# Patient Record
Sex: MALE | Race: WHITE | NOT HISPANIC OR LATINO | Employment: FULL TIME | URBAN - NONMETROPOLITAN AREA
[De-identification: names, ages, dates, MRNs, and addresses within clinical notes are randomized per-mention and may not be internally consistent; named-entity substitution may affect disease eponyms.]

---

## 2021-03-16 ENCOUNTER — APPOINTMENT (OUTPATIENT)
Dept: GENERAL RADIOLOGY | Facility: HOSPITAL | Age: 31
End: 2021-03-16

## 2021-03-16 ENCOUNTER — APPOINTMENT (OUTPATIENT)
Dept: CT IMAGING | Facility: HOSPITAL | Age: 31
End: 2021-03-16

## 2021-03-16 ENCOUNTER — HOSPITAL ENCOUNTER (EMERGENCY)
Facility: HOSPITAL | Age: 31
Discharge: HOME | End: 2021-03-16
Attending: EMERGENCY MEDICINE | Admitting: EMERGENCY MEDICINE

## 2021-03-16 VITALS
BODY MASS INDEX: 41.91 KG/M2 | TEMPERATURE: 97.4 F | RESPIRATION RATE: 20 BRPM | HEIGHT: 68 IN | WEIGHT: 276.5 LBS | OXYGEN SATURATION: 98 % | HEART RATE: 81 BPM | SYSTOLIC BLOOD PRESSURE: 136 MMHG | DIASTOLIC BLOOD PRESSURE: 60 MMHG

## 2021-03-16 DIAGNOSIS — R00.2 PALPITATIONS: Primary | ICD-10-CM

## 2021-03-16 DIAGNOSIS — M89.9 LYTIC BONE LESIONS ON XRAY: ICD-10-CM

## 2021-03-16 DIAGNOSIS — D72.829 LEUKOCYTOSIS, UNSPECIFIED TYPE: ICD-10-CM

## 2021-03-16 LAB
ANION GAP SERPL CALCULATED.3IONS-SCNC: 11 MMOL/L (ref 5–15)
APTT PPP: 25.5 SECONDS (ref 20–40.3)
BASOPHILS # BLD AUTO: 0.02 10*3/MM3 (ref 0–0.2)
BASOPHILS NFR BLD AUTO: 0.1 % (ref 0–1.5)
BILIRUB UR QL STRIP: NEGATIVE
BUN SERPL-MCNC: 12 MG/DL (ref 6–20)
BUN/CREAT SERPL: 13.8 (ref 7–25)
CALCIUM SPEC-SCNC: 9.6 MG/DL (ref 8.6–10.5)
CHLORIDE SERPL-SCNC: 102 MMOL/L (ref 98–107)
CLARITY UR: CLEAR
CO2 SERPL-SCNC: 25 MMOL/L (ref 22–29)
COLOR UR: YELLOW
CREAT SERPL-MCNC: 0.87 MG/DL (ref 0.76–1.27)
D-DIMER, QUANTITATIVE (MAD,POW, STR): <270 NG/ML (FEU) (ref 0–470)
DEPRECATED RDW RBC AUTO: 42.1 FL (ref 37–54)
EOSINOPHIL # BLD AUTO: 0 10*3/MM3 (ref 0–0.4)
EOSINOPHIL NFR BLD AUTO: 0 % (ref 0.3–6.2)
ERYTHROCYTE [DISTWIDTH] IN BLOOD BY AUTOMATED COUNT: 13.5 % (ref 12.3–15.4)
GFR SERPL CREATININE-BSD FRML MDRD: 102 ML/MIN/1.73
GLUCOSE SERPL-MCNC: 116 MG/DL (ref 65–99)
GLUCOSE UR STRIP-MCNC: NEGATIVE MG/DL
HCT VFR BLD AUTO: 41.2 % (ref 37.5–51)
HGB BLD-MCNC: 14 G/DL (ref 13–17.7)
HGB UR QL STRIP.AUTO: NEGATIVE
HOLD SPECIMEN: NORMAL
IMM GRANULOCYTES # BLD AUTO: 0.06 10*3/MM3 (ref 0–0.05)
IMM GRANULOCYTES NFR BLD AUTO: 0.3 % (ref 0–0.5)
INR PPP: 1.03 (ref 0.8–1.2)
KETONES UR QL STRIP: NEGATIVE
LEUKOCYTE ESTERASE UR QL STRIP.AUTO: NEGATIVE
LYMPHOCYTES # BLD AUTO: 1.67 10*3/MM3 (ref 0.7–3.1)
LYMPHOCYTES NFR BLD AUTO: 9.5 % (ref 19.6–45.3)
MAGNESIUM SERPL-MCNC: 2 MG/DL (ref 1.6–2.6)
MCH RBC QN AUTO: 29.4 PG (ref 26.6–33)
MCHC RBC AUTO-ENTMCNC: 34 G/DL (ref 31.5–35.7)
MCV RBC AUTO: 86.6 FL (ref 79–97)
MONOCYTES # BLD AUTO: 0.68 10*3/MM3 (ref 0.1–0.9)
MONOCYTES NFR BLD AUTO: 3.9 % (ref 5–12)
NEUTROPHILS NFR BLD AUTO: 15.14 10*3/MM3 (ref 1.7–7)
NEUTROPHILS NFR BLD AUTO: 86.2 % (ref 42.7–76)
NITRITE UR QL STRIP: NEGATIVE
NRBC BLD AUTO-RTO: 0 /100 WBC (ref 0–0.2)
NT-PROBNP SERPL-MCNC: 43.8 PG/ML (ref 0–450)
PH UR STRIP.AUTO: 8 [PH] (ref 5–9)
PLATELET # BLD AUTO: 316 10*3/MM3 (ref 140–450)
PMV BLD AUTO: 10 FL (ref 6–12)
POTASSIUM SERPL-SCNC: 4.2 MMOL/L (ref 3.5–5.2)
PROT UR QL STRIP: NEGATIVE
PROTHROMBIN TIME: 13.9 SECONDS (ref 11.1–15.3)
RBC # BLD AUTO: 4.76 10*6/MM3 (ref 4.14–5.8)
SODIUM SERPL-SCNC: 138 MMOL/L (ref 136–145)
SP GR UR STRIP: 1.01 (ref 1–1.03)
T4 FREE SERPL-MCNC: 1.24 NG/DL (ref 0.93–1.7)
TROPONIN T SERPL-MCNC: <0.01 NG/ML (ref 0–0.03)
TROPONIN T SERPL-MCNC: <0.01 NG/ML (ref 0–0.03)
TSH SERPL DL<=0.05 MIU/L-ACNC: 2.13 UIU/ML (ref 0.27–4.2)
UROBILINOGEN UR QL STRIP: NORMAL
WBC # BLD AUTO: 17.57 10*3/MM3 (ref 3.4–10.8)

## 2021-03-16 PROCEDURE — 87040 BLOOD CULTURE FOR BACTERIA: CPT | Performed by: EMERGENCY MEDICINE

## 2021-03-16 PROCEDURE — 83735 ASSAY OF MAGNESIUM: CPT | Performed by: EMERGENCY MEDICINE

## 2021-03-16 PROCEDURE — 80048 BASIC METABOLIC PNL TOTAL CA: CPT | Performed by: EMERGENCY MEDICINE

## 2021-03-16 PROCEDURE — 99284 EMERGENCY DEPT VISIT MOD MDM: CPT

## 2021-03-16 PROCEDURE — 93005 ELECTROCARDIOGRAM TRACING: CPT

## 2021-03-16 PROCEDURE — 85730 THROMBOPLASTIN TIME PARTIAL: CPT | Performed by: EMERGENCY MEDICINE

## 2021-03-16 PROCEDURE — 85379 FIBRIN DEGRADATION QUANT: CPT | Performed by: EMERGENCY MEDICINE

## 2021-03-16 PROCEDURE — 85610 PROTHROMBIN TIME: CPT | Performed by: EMERGENCY MEDICINE

## 2021-03-16 PROCEDURE — 36415 COLL VENOUS BLD VENIPUNCTURE: CPT | Performed by: EMERGENCY MEDICINE

## 2021-03-16 PROCEDURE — 93005 ELECTROCARDIOGRAM TRACING: CPT | Performed by: EMERGENCY MEDICINE

## 2021-03-16 PROCEDURE — 84439 ASSAY OF FREE THYROXINE: CPT | Performed by: EMERGENCY MEDICINE

## 2021-03-16 PROCEDURE — 84484 ASSAY OF TROPONIN QUANT: CPT | Performed by: EMERGENCY MEDICINE

## 2021-03-16 PROCEDURE — 96360 HYDRATION IV INFUSION INIT: CPT

## 2021-03-16 PROCEDURE — 71045 X-RAY EXAM CHEST 1 VIEW: CPT

## 2021-03-16 PROCEDURE — 85025 COMPLETE CBC W/AUTO DIFF WBC: CPT | Performed by: EMERGENCY MEDICINE

## 2021-03-16 PROCEDURE — 93010 ELECTROCARDIOGRAM REPORT: CPT | Performed by: INTERNAL MEDICINE

## 2021-03-16 PROCEDURE — 81003 URINALYSIS AUTO W/O SCOPE: CPT | Performed by: EMERGENCY MEDICINE

## 2021-03-16 PROCEDURE — 25010000002 IOPAMIDOL 61 % SOLUTION: Performed by: EMERGENCY MEDICINE

## 2021-03-16 PROCEDURE — 83880 ASSAY OF NATRIURETIC PEPTIDE: CPT | Performed by: EMERGENCY MEDICINE

## 2021-03-16 PROCEDURE — 71260 CT THORAX DX C+: CPT

## 2021-03-16 PROCEDURE — 84443 ASSAY THYROID STIM HORMONE: CPT | Performed by: EMERGENCY MEDICINE

## 2021-03-16 RX ORDER — SODIUM CHLORIDE 9 MG/ML
125 INJECTION, SOLUTION INTRAVENOUS CONTINUOUS
Status: DISCONTINUED | OUTPATIENT
Start: 2021-03-16 | End: 2021-03-16 | Stop reason: HOSPADM

## 2021-03-16 RX ORDER — SODIUM CHLORIDE 0.9 % (FLUSH) 0.9 %
10 SYRINGE (ML) INJECTION AS NEEDED
Status: DISCONTINUED | OUTPATIENT
Start: 2021-03-16 | End: 2021-03-16 | Stop reason: HOSPADM

## 2021-03-16 RX ADMIN — SODIUM CHLORIDE 1000 ML: 9 INJECTION, SOLUTION INTRAVENOUS at 07:49

## 2021-03-16 RX ADMIN — IOPAMIDOL 90 ML: 612 INJECTION, SOLUTION INTRAVENOUS at 10:40

## 2021-03-16 NOTE — DISCHARGE INSTRUCTIONS
Return ED chest pain ,shortness of air, syncope, palpitations, worse condition, any other concerns  Followup orthopedic surgery as directed for further evaluation right clavicle bone lesion

## 2021-03-18 ENCOUNTER — OFFICE VISIT (OUTPATIENT)
Dept: FAMILY MEDICINE CLINIC | Facility: CLINIC | Age: 31
End: 2021-03-18

## 2021-03-18 VITALS
HEART RATE: 100 BPM | WEIGHT: 274.6 LBS | DIASTOLIC BLOOD PRESSURE: 72 MMHG | HEIGHT: 68 IN | SYSTOLIC BLOOD PRESSURE: 148 MMHG | TEMPERATURE: 97.5 F | OXYGEN SATURATION: 98 % | BODY MASS INDEX: 41.62 KG/M2

## 2021-03-18 DIAGNOSIS — Z09 FOLLOW UP: Primary | ICD-10-CM

## 2021-03-18 DIAGNOSIS — R00.2 HEART PALPITATIONS: ICD-10-CM

## 2021-03-18 DIAGNOSIS — K04.7 DENTAL INFECTION: ICD-10-CM

## 2021-03-18 DIAGNOSIS — J30.9 ALLERGIC RHINITIS, UNSPECIFIED SEASONALITY, UNSPECIFIED TRIGGER: ICD-10-CM

## 2021-03-18 PROCEDURE — 99203 OFFICE O/P NEW LOW 30 MIN: CPT | Performed by: NURSE PRACTITIONER

## 2021-03-18 RX ORDER — FLUTICASONE PROPIONATE 50 MCG
2 SPRAY, SUSPENSION (ML) NASAL DAILY
Qty: 16 G | Refills: 11 | Status: SHIPPED | OUTPATIENT
Start: 2021-03-18

## 2021-03-18 RX ORDER — LORATADINE 10 MG/1
10 TABLET ORAL DAILY
Qty: 30 TABLET | Refills: 11 | Status: SHIPPED | OUTPATIENT
Start: 2021-03-18

## 2021-03-18 RX ORDER — AMOXICILLIN 875 MG/1
875 TABLET, COATED ORAL 2 TIMES DAILY
Qty: 20 TABLET | Refills: 0 | Status: SHIPPED | OUTPATIENT
Start: 2021-03-18 | End: 2021-04-16

## 2021-03-18 NOTE — PROGRESS NOTES
Subjective   Thien Green is a 31 y.o. male. ER follow up.     History of Present Illness   Patient presents today for ER follow up. He went to the ER a few days ago for heart palpations. The ER discovered nodule on his right clavicle, he is seeing orthopedics next week. His blood work revealed high white blood cell count, all other labs unremarkable. EKG showed sinus tachycardia. Pt says he has stopped drinking energy drinks and cokes. He now drinks mostly water with an occasional green tea. Pt denies any chest pain, lower extremity edema, or fatigue. He is still having occasional heart palpations but not nearly as often as he was when he was taking in large amounts of caffeine. Reports broken tooth, has not seen a dentist.    The following portions of the patient's history were reviewed and updated as appropriate: allergies, current medications, past family history, past medical history, past social history, past surgical history, and problem list.    Review of Systems   Constitutional: Negative for chills, fatigue and fever.   HENT: Positive for dental problem. Negative for congestion, ear pain, rhinorrhea and sore throat.    Eyes: Negative for blurred vision, double vision and visual disturbance.   Respiratory: Negative for cough, chest tightness, shortness of breath and wheezing.    Cardiovascular: Negative for chest pain, palpitations and leg swelling.   Gastrointestinal: Negative for abdominal pain, diarrhea, nausea and vomiting.   Endocrine: Negative for cold intolerance and heat intolerance.   Genitourinary: Negative for difficulty urinating, dysuria, frequency and hematuria.   Musculoskeletal: Negative for arthralgias, back pain, neck pain and neck stiffness.   Skin: Negative for dry skin, pallor, rash, skin lesions and bruise.   Allergic/Immunologic: Negative for environmental allergies, food allergies and immunocompromised state.   Neurological: Negative for dizziness, syncope, weakness,  light-headedness, headache and confusion.   Hematological: Negative for adenopathy. Does not bruise/bleed easily.   Psychiatric/Behavioral: Negative for self-injury, sleep disturbance, suicidal ideas, depressed mood and stress. The patient is not nervous/anxious.        Vitals:    03/18/21 1343   BP: 148/72   Pulse: 100   Temp: 97.5 °F (36.4 °C)   SpO2: 98%     Body mass index is 41.75 kg/m².    Objective   Physical Exam  Vitals reviewed.   Constitutional:       General: He is not in acute distress.     Appearance: Normal appearance. He is well-developed. He is not ill-appearing.   HENT:      Head: Normocephalic.      Right Ear: Hearing, tympanic membrane, ear canal and external ear normal.      Left Ear: Hearing, tympanic membrane, ear canal and external ear normal.      Nose: Mucosal edema present.      Mouth/Throat:      Lips: Pink.      Mouth: Mucous membranes are moist.      Dentition: Dental abscesses present.      Pharynx: Oropharynx is clear. Uvula midline. No oropharyngeal exudate.     Eyes:      General: Lids are normal. Gaze aligned appropriately.      Extraocular Movements: Extraocular movements intact.      Conjunctiva/sclera: Conjunctivae normal.      Pupils: Pupils are equal, round, and reactive to light.   Neck:      Thyroid: No thyroid mass, thyromegaly or thyroid tenderness.   Cardiovascular:      Rate and Rhythm: Normal rate and regular rhythm.      Heart sounds: Normal heart sounds, S1 normal and S2 normal. No murmur heard.     Pulmonary:      Effort: Pulmonary effort is normal.      Breath sounds: Normal breath sounds and air entry. No decreased breath sounds, wheezing, rhonchi or rales.   Abdominal:      General: Abdomen is flat. Bowel sounds are normal.      Palpations: Abdomen is soft.      Tenderness: There is no abdominal tenderness. There is no right CVA tenderness, left CVA tenderness, guarding or rebound. Negative signs include Brock's sign, Rovsing's sign, McBurney's sign, psoas sign  and obturator sign.   Musculoskeletal:         General: Normal range of motion.      Cervical back: Full passive range of motion without pain, normal range of motion and neck supple.   Skin:     General: Skin is warm and dry.   Neurological:      Mental Status: He is alert and oriented to person, place, and time.      Coordination: Coordination is intact.      Gait: Gait is intact.   Psychiatric:         Attention and Perception: Attention and perception normal.         Mood and Affect: Mood and affect normal.         Speech: Speech normal.         Behavior: Behavior normal. Behavior is cooperative.         Thought Content: Thought content normal.         Cognition and Memory: Cognition and memory normal.         Judgment: Judgment normal.           Assessment/Plan   Diagnoses and all orders for this visit:    1. Follow up (Primary)    2. Heart palpitations  -     TSH    3. Allergic rhinitis, unspecified seasonality, unspecified trigger  -     loratadine (Claritin) 10 MG tablet; Take 1 tablet by mouth Daily.  Dispense: 30 tablet; Refill: 11  -     fluticasone (Flonase) 50 MCG/ACT nasal spray; 2 sprays by Each Nare route Daily for 30 days.  Dispense: 16 g; Refill: 11    4. Dental infection  -     amoxicillin (AMOXIL) 875 MG tablet; Take 1 tablet by mouth 2 (Two) Times a Day.  Dispense: 20 tablet; Refill: 0    Heart palpations and tachycardia most likely to large consumption of caffeine. Pt has cut out cokes and energy drinks so the palpations and tachycardia should improve.   TSH checked with existing blood work, will call pt with results.  Pt instructed to take Claritin and flonase as directed for allergic rhinitis.  He is to take amoxicillin 875 mg BID for 10 days for dental infection. WBC count may have been elevated due to dental infection. Stressed importance of scheduling appointment to see a dentist.  If symptoms do not improve or worsen, patient was instructed to return to clinic for further evaluation.          This document has been electronically signed by REAL Sanchez on  March 18, 2021 15:10 CDT

## 2021-03-21 LAB
BACTERIA SPEC AEROBE CULT: NORMAL
BACTERIA SPEC AEROBE CULT: NORMAL
QT INTERVAL: 324 MS
QTC INTERVAL: 438 MS

## 2021-03-22 ENCOUNTER — OFFICE VISIT (OUTPATIENT)
Dept: ORTHOPEDIC SURGERY | Facility: CLINIC | Age: 31
End: 2021-03-22

## 2021-03-22 ENCOUNTER — TELEPHONE (OUTPATIENT)
Dept: FAMILY MEDICINE CLINIC | Facility: CLINIC | Age: 31
End: 2021-03-22

## 2021-03-22 VITALS
OXYGEN SATURATION: 98 % | TEMPERATURE: 98.4 F | HEART RATE: 110 BPM | BODY MASS INDEX: 41.52 KG/M2 | DIASTOLIC BLOOD PRESSURE: 91 MMHG | HEIGHT: 68 IN | RESPIRATION RATE: 18 BRPM | WEIGHT: 274 LBS | SYSTOLIC BLOOD PRESSURE: 158 MMHG

## 2021-03-22 DIAGNOSIS — M89.8X1 PAIN OF RIGHT CLAVICLE: Primary | ICD-10-CM

## 2021-03-22 DIAGNOSIS — M85.60 BONE CYST: ICD-10-CM

## 2021-03-22 DIAGNOSIS — M89.8X9 RADIOLUCENT LESION OF BONE: ICD-10-CM

## 2021-03-22 PROCEDURE — 99203 OFFICE O/P NEW LOW 30 MIN: CPT | Performed by: ORTHOPAEDIC SURGERY

## 2021-03-22 NOTE — TELEPHONE ENCOUNTER
Mr. Green called and wanted to speak with you regarding his lab results.  He has some questions.      Thanks

## 2021-03-22 NOTE — PROGRESS NOTES
Thien Green is a 31 y.o. male   Primary provider:  Provider, No Known       Chief Complaint   Patient presents with   • Right Clavicle - Pain, Initial Evaluation       HISTORY OF PRESENT ILLNESS:  Patient in for initial evaluation of right clavicle lesion.  Pain 0/10    This is the first office visit for evaluation of the lesion of the right clavicle.    Mr. Green is 31 years old and right-hand dominant.  He was in the emergency room recently for evaluation of an unrelated medical problem and had a CT scan of the chest.  This showed an abnormality of the right clavicle.  He has had no pain or any symptoms referable to the shoulder or clavicle.  He said he had a fracture of one of his clavicles as a child but he does not remember which side this was.  He denies any constitutional symptoms such as weight loss.    Home medications include Claritin and Flonase.  He has no drug allergies.  He smokes 1 pack/day of cigarettes.  His general health is good.  He is employed as a .  He is single.      Pain  This is a new problem. Episode onset: unknown. The problem occurs daily. The problem has been unchanged. Nothing aggravates the symptoms. He has tried nothing for the symptoms. The treatment provided no relief.        CONCURRENT MEDICAL HISTORY:    History reviewed. No pertinent past medical history.    No Known Allergies      Current Outpatient Medications:   •  amoxicillin (AMOXIL) 875 MG tablet, Take 1 tablet by mouth 2 (Two) Times a Day., Disp: 20 tablet, Rfl: 0  •  fluticasone (Flonase) 50 MCG/ACT nasal spray, 2 sprays by Each Nare route Daily for 30 days., Disp: 16 g, Rfl: 11  •  loratadine (Claritin) 10 MG tablet, Take 1 tablet by mouth Daily., Disp: 30 tablet, Rfl: 11    No past surgical history on file.    History reviewed. No pertinent family history.     Social History     Socioeconomic History   • Marital status: Single     Spouse name: Not on file   • Number of children: Not on file   •  "Years of education: Not on file   • Highest education level: Not on file   Tobacco Use   • Smoking status: Current Every Day Smoker     Packs/day: 1.00     Years: 15.00     Pack years: 15.00     Types: Cigarettes     Start date: 2006   • Smokeless tobacco: Never Used   Substance and Sexual Activity   • Alcohol use: Never   • Sexual activity: Never        Review of Systems   All other systems reviewed and are negative.    Review of systems is positive as noted above.    PHYSICAL EXAMINATION:       Vitals:    03/22/21 0937   BP: 158/91   BP Location: Left arm   Patient Position: Sitting   Cuff Size: Adult   Pulse: 110   Resp: 18   Temp: 98.4 °F (36.9 °C)   SpO2: 98%   Weight: 124 kg (274 lb)   Height: 172.7 cm (68\")   PainSc: 0-No pain       Physical Exam in general he is healthy-appearing alert pleasant and in no apparent distress.  He responds appropriately to questions and commands.    GAIT:     [x]  Normal  []  Antalgic    Assistive device: [x]  None  []  Walker     []  Crutches  []  Cane     []  Wheelchair  []  Stretcher    Ortho Exam is directed to the upper extremities.  Right shoulder motion is full in all planes.  There is no tenderness about the shoulder.  There is no visible asymmetry in the shoulders.  Radial pulse is strong on the right.  Sensory exam is intact to soft touch.    Chest x-ray and CT scan of the chest dated 16th March were reviewed.  There is a loculated expansile lesion of the distal 1/3-1/2 of the clavicle.  This extends to the epiphyseal region.  There are no fractures.  There is no periostitis or any apparent calcification within the lesion.  The lesion is incompletely visualized on the studies.            ASSESSMENT: Lesion right clavicle.  This has the appearance of a benign lesion most likely a simple bone cyst.    The uncertainty of the diagnosis was discussed with him.  Considering his clinical findings and imaging results I feel that simple observation is appropriate.  He " understands that in order to confirm a diagnosis would likely require biopsy.  He appears to understand and is content to observe this.  He may continue activities as tolerated.    ReTurn here in 6 months for repeat x-rays of the clavicle.    Diagnoses and all orders for this visit:    Pain of right clavicle    Radiolucent lesion of bone  -     XR Clavicle Right    Bone cyst          PLAN  Patient's Body mass index is 41.66 kg/m². BMI is above normal parameters. Recommendations include: exercise counseling, nutrition counseling and referral to primary care.    Return in about 6 months (around 9/22/2021).    Dionicio Kirby MD

## 2021-04-15 ENCOUNTER — OFFICE VISIT (OUTPATIENT)
Dept: FAMILY MEDICINE CLINIC | Facility: CLINIC | Age: 31
End: 2021-04-15

## 2021-04-15 VITALS
SYSTOLIC BLOOD PRESSURE: 140 MMHG | OXYGEN SATURATION: 98 % | TEMPERATURE: 98 F | HEART RATE: 90 BPM | HEIGHT: 68 IN | BODY MASS INDEX: 42.41 KG/M2 | DIASTOLIC BLOOD PRESSURE: 88 MMHG | WEIGHT: 279.8 LBS

## 2021-04-15 DIAGNOSIS — I10 ESSENTIAL HYPERTENSION: ICD-10-CM

## 2021-04-15 DIAGNOSIS — R00.0 TACHYCARDIA: Primary | ICD-10-CM

## 2021-04-15 PROCEDURE — 99213 OFFICE O/P EST LOW 20 MIN: CPT | Performed by: NURSE PRACTITIONER

## 2021-04-15 RX ORDER — PROPRANOLOL HCL 60 MG
60 CAPSULE, EXTENDED RELEASE 24HR ORAL DAILY
Qty: 30 CAPSULE | Refills: 0 | Status: SHIPPED | OUTPATIENT
Start: 2021-04-15 | End: 2021-04-23 | Stop reason: SDUPTHER

## 2021-04-15 NOTE — PROGRESS NOTES
"Subjective   Thien Green is a 31 y.o. male. Tachycardia.    History of Present Illness   Patient presents today for tachycardia. He says he experiences episodes at work where he feels anxious and he gets tachycardic. Pt says he has stopped all consumption of caffeine. He has had two ER visits due to tachy cardia. Labs have been unremarkable. EKG's show sinus tachycardia. Pt says he has a lot of \"personal stuff\" going on right now. In the past he took celexa for anxiety and it made him too drowsy. Feels he worries too much and says it is \"hard to shut brain down\". Denies any drug or alcohol use. Denies any suicidal thoughts or actions. Denies any chest pain, chest tightness, shortness of breath, or fatigue.    The following portions of the patient's history were reviewed and updated as appropriate: allergies, current medications, past family history, past medical history, past social history, past surgical history, and problem list.    Review of Systems   Constitutional: Negative for chills, fatigue and fever.   HENT: Negative for congestion, ear pain, rhinorrhea and sore throat.    Eyes: Negative for blurred vision, double vision and visual disturbance.   Respiratory: Negative for cough, chest tightness, shortness of breath and wheezing.    Cardiovascular: Positive for palpitations (tachycardia). Negative for chest pain and leg swelling.   Gastrointestinal: Negative for abdominal pain, diarrhea, nausea and vomiting.   Endocrine: Negative for cold intolerance and heat intolerance.   Genitourinary: Negative for difficulty urinating, dysuria, frequency and hematuria.   Musculoskeletal: Negative for arthralgias, back pain, neck pain and neck stiffness.   Skin: Negative for dry skin, pallor, rash, skin lesions and bruise.   Allergic/Immunologic: Negative for environmental allergies, food allergies and immunocompromised state.   Neurological: Negative for dizziness, syncope, weakness, light-headedness, headache and " confusion.   Hematological: Negative for adenopathy. Does not bruise/bleed easily.   Psychiatric/Behavioral: Negative for self-injury, sleep disturbance, suicidal ideas, depressed mood and stress. The patient is not nervous/anxious.        Vitals:    04/15/21 1547   BP: 140/88   Pulse: 90   Temp: 98 °F (36.7 °C)   SpO2: 98%     Body mass index is 42.54 kg/m².    Objective   Physical Exam  Vitals and nursing note reviewed.   Constitutional:       General: He is not in acute distress.     Appearance: Normal appearance. He is well-developed. He is not ill-appearing.   HENT:      Head: Normocephalic.   Eyes:      Conjunctiva/sclera: Conjunctivae normal.   Neck:      Thyroid: No thyroid mass, thyromegaly or thyroid tenderness.   Cardiovascular:      Rate and Rhythm: Normal rate and regular rhythm.      Heart sounds: Normal heart sounds, S1 normal and S2 normal. No murmur heard.     Pulmonary:      Effort: Pulmonary effort is normal.      Breath sounds: Normal breath sounds and air entry. No decreased breath sounds, wheezing, rhonchi or rales.   Musculoskeletal:         General: Normal range of motion.      Cervical back: Full passive range of motion without pain, normal range of motion and neck supple.   Skin:     General: Skin is warm and dry.   Neurological:      Mental Status: He is alert and oriented to person, place, and time.      Coordination: Coordination normal.      Gait: Gait normal.   Psychiatric:         Attention and Perception: Attention normal.         Mood and Affect: Mood normal.         Speech: Speech normal.         Behavior: Behavior normal. Behavior is cooperative.         Thought Content: Thought content normal.         Cognition and Memory: Cognition normal.         Judgment: Judgment normal.           Assessment/Plan   Diagnoses and all orders for this visit:    1. Tachycardia (Primary)  -     propranolol LA (INDERAL LA) 60 MG 24 hr capsule; Take 1 capsule by mouth Daily.  Dispense: 30 capsule;  "Refill: 0    2. Essential hypertension  -     propranolol LA (INDERAL LA) 60 MG 24 hr capsule; Take 1 capsule by mouth Daily.  Dispense: 30 capsule; Refill: 0    Symptoms most likely secondary to anxiety. Plan is to try propranolol which will keep heart rate down and has a \"calming\" effect on the brain. Pt instructed to take propranolol 60 mg daily and follow up in one week. He understands to not take if heart rate is less than 60 bpm.  If symptoms do not improve or worsen, patient was instructed to return to clinic for further evaluation.   No follow-ups on file.        This document has been electronically signed by REAL Sanchez on  April 16, 2021 08:53 CDT             "

## 2021-04-23 ENCOUNTER — OFFICE VISIT (OUTPATIENT)
Dept: FAMILY MEDICINE CLINIC | Facility: CLINIC | Age: 31
End: 2021-04-23

## 2021-04-23 VITALS
TEMPERATURE: 98.4 F | BODY MASS INDEX: 41.62 KG/M2 | HEART RATE: 81 BPM | HEIGHT: 68 IN | WEIGHT: 274.6 LBS | SYSTOLIC BLOOD PRESSURE: 132 MMHG | OXYGEN SATURATION: 98 % | DIASTOLIC BLOOD PRESSURE: 80 MMHG

## 2021-04-23 DIAGNOSIS — R00.0 TACHYCARDIA: Primary | ICD-10-CM

## 2021-04-23 DIAGNOSIS — G47.00 INSOMNIA, UNSPECIFIED TYPE: ICD-10-CM

## 2021-04-23 DIAGNOSIS — I10 ESSENTIAL HYPERTENSION: ICD-10-CM

## 2021-04-23 PROCEDURE — 99213 OFFICE O/P EST LOW 20 MIN: CPT | Performed by: NURSE PRACTITIONER

## 2021-04-23 RX ORDER — PROPRANOLOL HCL 60 MG
60 CAPSULE, EXTENDED RELEASE 24HR ORAL DAILY
Qty: 90 CAPSULE | Refills: 3 | Status: SHIPPED | OUTPATIENT
Start: 2021-04-23 | End: 2022-05-31

## 2021-04-23 RX ORDER — HYDROXYZINE PAMOATE 25 MG/1
25 CAPSULE ORAL 3 TIMES DAILY PRN
Qty: 90 CAPSULE | Refills: 0 | Status: SHIPPED | OUTPATIENT
Start: 2021-04-23 | End: 2021-05-17

## 2021-04-23 NOTE — PROGRESS NOTES
"Subjective   Thien Green is a 31 y.o. male. Follow up    History of Present Illness   Patient presents today for a one week follow up. He was started on propranolol for tachycardia and essential hypertension. He says he is feeling better since taking medication. Denies any bothersome side effects. Reports trouble staying asleep. 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 He says he would like to switch to days but there are currently no openings. He also reports trouble \"turning his mind\" off when he lays down to sleep. Pt would like to know if there are any medications that could help.    The following portions of the patient's history were reviewed and updated as appropriate: allergies, current medications, past family history, past medical history, past social history, past surgical history, and problem list.    Review of Systems   Constitutional: Negative for chills, fatigue and fever.   HENT: Negative for congestion, ear pain, rhinorrhea and sore throat.    Eyes: Negative for blurred vision, double vision and visual disturbance.   Respiratory: Negative for cough, chest tightness, shortness of breath and wheezing.    Cardiovascular: Negative for chest pain, palpitations and leg swelling.   Gastrointestinal: Negative for abdominal pain, diarrhea, nausea and vomiting.   Endocrine: Negative for cold intolerance and heat intolerance.   Genitourinary: Negative for difficulty urinating, dysuria, frequency and hematuria.   Musculoskeletal: Negative for arthralgias, back pain, neck pain and neck stiffness.   Skin: Negative for dry skin, pallor, rash, skin lesions and bruise.   Allergic/Immunologic: Negative for environmental allergies, food allergies and immunocompromised state.   Neurological: Negative for dizziness, syncope, weakness, light-headedness, headache and confusion.   Hematological: Negative for adenopathy. Does not bruise/bleed easily. "   Psychiatric/Behavioral: Positive for sleep disturbance. Negative for self-injury, suicidal ideas, depressed mood and stress. The patient is not nervous/anxious.        Vitals:    04/23/21 1559   BP: 132/80   Pulse: 81   Temp: 98.4 °F (36.9 °C)   SpO2: 98%     Body mass index is 41.75 kg/m².    Objective   Physical Exam  Vitals and nursing note reviewed.   Constitutional:       General: He is not in acute distress.     Appearance: Normal appearance. He is well-developed. He is not ill-appearing.   HENT:      Head: Normocephalic.   Eyes:      Conjunctiva/sclera: Conjunctivae normal.   Cardiovascular:      Rate and Rhythm: Normal rate and regular rhythm.      Heart sounds: Normal heart sounds, S1 normal and S2 normal. No murmur heard.     Pulmonary:      Effort: Pulmonary effort is normal.      Breath sounds: Normal breath sounds and air entry. No stridor. No decreased breath sounds, wheezing, rhonchi or rales.   Musculoskeletal:         General: Normal range of motion.      Cervical back: Full passive range of motion without pain, normal range of motion and neck supple.   Skin:     General: Skin is warm and dry.   Neurological:      Mental Status: He is alert and oriented to person, place, and time.      Coordination: Coordination normal.      Gait: Gait normal.   Psychiatric:         Attention and Perception: Attention normal.         Mood and Affect: Mood normal.         Speech: Speech normal.         Behavior: Behavior normal. Behavior is cooperative.         Thought Content: Thought content normal.         Cognition and Memory: Cognition normal.         Judgment: Judgment normal.           Assessment/Plan   Diagnoses and all orders for this visit:    1. Tachycardia (Primary)  -     propranolol LA (INDERAL LA) 60 MG 24 hr capsule; Take 1 capsule by mouth Daily.  Dispense: 90 capsule; Refill: 3    2. Essential hypertension  -     propranolol LA (INDERAL LA) 60 MG 24 hr capsule; Take 1 capsule by mouth Daily.   Dispense: 90 capsule; Refill: 3    3. Insomnia, unspecified type  -     hydrOXYzine pamoate (Vistaril) 25 MG capsule; Take 1 capsule by mouth 3 (Three) Times a Day As Needed for Itching.  Dispense: 90 capsule; Refill: 0    Pt doing well on propranolol, he should continue taking propranolol 60 mg daily.  Discussed sleep hygiene habits to help him fall asleep. 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 Plan is for him to try vistaril 25 mg before bedtime for sleep/anxiety, warned of drowsiness.  If symptoms do not improve or worsen, patient was instructed to return to clinic for further evaluation.         This document has been electronically signed by REAL Sanchez on  April 26, 2021 09:59 CDT

## 2021-05-15 DIAGNOSIS — G47.00 INSOMNIA, UNSPECIFIED TYPE: ICD-10-CM

## 2021-05-17 RX ORDER — HYDROXYZINE PAMOATE 25 MG/1
25 CAPSULE ORAL 3 TIMES DAILY PRN
Qty: 90 CAPSULE | Refills: 0 | Status: SHIPPED | OUTPATIENT
Start: 2021-05-17

## 2021-06-03 ENCOUNTER — LAB (OUTPATIENT)
Dept: LAB | Facility: HOSPITAL | Age: 31
End: 2021-06-03

## 2021-06-03 ENCOUNTER — OFFICE VISIT (OUTPATIENT)
Dept: FAMILY MEDICINE CLINIC | Facility: CLINIC | Age: 31
End: 2021-06-03

## 2021-06-03 VITALS
HEIGHT: 68 IN | SYSTOLIC BLOOD PRESSURE: 158 MMHG | WEIGHT: 272.8 LBS | BODY MASS INDEX: 41.34 KG/M2 | HEART RATE: 98 BPM | OXYGEN SATURATION: 98 % | DIASTOLIC BLOOD PRESSURE: 90 MMHG | TEMPERATURE: 97.5 F

## 2021-06-03 DIAGNOSIS — I10 ESSENTIAL HYPERTENSION: ICD-10-CM

## 2021-06-03 DIAGNOSIS — R53.83 FATIGUE, UNSPECIFIED TYPE: Primary | ICD-10-CM

## 2021-06-03 DIAGNOSIS — G47.30 SLEEP APNEA, UNSPECIFIED TYPE: ICD-10-CM

## 2021-06-03 PROCEDURE — 99213 OFFICE O/P EST LOW 20 MIN: CPT | Performed by: NURSE PRACTITIONER

## 2021-06-03 PROCEDURE — 80053 COMPREHEN METABOLIC PANEL: CPT | Performed by: NURSE PRACTITIONER

## 2021-06-03 PROCEDURE — 84443 ASSAY THYROID STIM HORMONE: CPT | Performed by: NURSE PRACTITIONER

## 2021-06-03 PROCEDURE — 82607 VITAMIN B-12: CPT | Performed by: NURSE PRACTITIONER

## 2021-06-03 PROCEDURE — 85027 COMPLETE CBC AUTOMATED: CPT | Performed by: NURSE PRACTITIONER

## 2021-06-03 NOTE — PROGRESS NOTES
"Subjective   Thien Green is a 31 y.o. male. Fatigue    History of Present Illness   Patient presents today for fatigue. He says he has been experiencing fatigue and \"brain fog\" for past several months. He reports sleeping okay but still feels fatigued. He says he usually sleeps from 8 am - 5pm due to working 3rd shift. On his days off he tries to stay awake during the day and sleep at night. He would like to get off of 3rd shift but says he has not been given the chance thus far. Reports history of apnea while sleeping, he has been told he stops breathing while he is sleeping. Denies any drug or alcohol use. Not currently exercising but he is pretty active at his job.     The following portions of the patient's history were reviewed and updated as appropriate: allergies, current medications, past family history, past medical history, past social history, past surgical history, and problem list.    Review of Systems   Constitutional: Positive for fatigue. Negative for chills and fever.   HENT: Negative for congestion, ear pain, rhinorrhea and sore throat.    Eyes: Negative for blurred vision, double vision and visual disturbance.   Respiratory: Negative for cough, chest tightness, shortness of breath and wheezing.    Cardiovascular: Negative for chest pain, palpitations and leg swelling.   Gastrointestinal: Negative for abdominal pain, diarrhea, nausea and vomiting.   Endocrine: Negative for cold intolerance and heat intolerance.   Genitourinary: Negative for difficulty urinating, dysuria, frequency and hematuria.   Musculoskeletal: Negative for arthralgias, back pain, neck pain and neck stiffness.   Skin: Negative for dry skin, pallor, rash, skin lesions and bruise.   Allergic/Immunologic: Negative for environmental allergies, food allergies and immunocompromised state.   Neurological: Negative for dizziness, syncope, weakness, light-headedness, headache and confusion.   Hematological: Negative for adenopathy. " Does not bruise/bleed easily.   Psychiatric/Behavioral: Negative for self-injury, sleep disturbance, suicidal ideas, depressed mood and stress. The patient is not nervous/anxious.        Vitals:    06/03/21 1445   BP: 158/90   Pulse: 98   Temp: 97.5 °F (36.4 °C)   SpO2: 98%     Body mass index is 41.48 kg/m².    Objective   Physical Exam  Vitals reviewed.   Constitutional:       General: He is not in acute distress.     Appearance: Normal appearance. He is well-developed. He is not ill-appearing.   HENT:      Head: Normocephalic.      Right Ear: Hearing, tympanic membrane, ear canal and external ear normal.      Left Ear: Hearing, tympanic membrane, ear canal and external ear normal.      Nose: Nose normal.      Mouth/Throat:      Lips: Pink.      Mouth: Mucous membranes are moist.      Pharynx: Oropharynx is clear. Uvula midline. No oropharyngeal exudate.   Eyes:      General: Lids are normal. Gaze aligned appropriately.      Extraocular Movements: Extraocular movements intact.      Conjunctiva/sclera: Conjunctivae normal.      Pupils: Pupils are equal, round, and reactive to light.   Neck:      Thyroid: No thyroid mass, thyromegaly or thyroid tenderness.   Cardiovascular:      Rate and Rhythm: Normal rate and regular rhythm.   Pulmonary:      Effort: Pulmonary effort is normal.      Breath sounds: Normal breath sounds and air entry. No decreased breath sounds, wheezing, rhonchi or rales.   Musculoskeletal:         General: Normal range of motion.      Cervical back: Full passive range of motion without pain, normal range of motion and neck supple.   Lymphadenopathy:      Cervical: No cervical adenopathy.   Skin:     General: Skin is warm and dry.   Neurological:      General: No focal deficit present.      Mental Status: He is alert and oriented to person, place, and time.      Coordination: Coordination is intact.      Gait: Gait is intact.   Psychiatric:         Attention and Perception: Attention normal.          Mood and Affect: Mood normal.         Speech: Speech normal.         Behavior: Behavior normal. Behavior is cooperative.         Thought Content: Thought content normal.         Cognition and Memory: Cognition normal.         Judgment: Judgment normal.           Assessment/Plan   Diagnoses and all orders for this visit:    1. Fatigue, unspecified type (Primary)  -     Ambulatory Referral to Sleep Medicine  -     TSH  -     CBC (No Diff)  -     Comprehensive metabolic panel  -     Vitamin B12    2. Essential hypertension  -     Ambulatory Referral to Sleep Medicine    3. Sleep apnea, unspecified type  -     Ambulatory Referral to Sleep Medicine    Physical exam unremarkable.  My greatest concern is possible sleep apnea. Referral placed to Sleep Medicine for further evaluation.  Labs ordered to look for other causes of fatigue, will call pt with results.  3rd shift is very hard on the body. Recommended he try and get on different shift to have normal sleep routine if possible.  Pt brought FMLA paperwork for me to fill out, I plan to review paperwork and get back with him. Im not sure I can fill out FMLA papwerwork without a definitive diagnosis as to why FMLA is necessary.  If symptoms do not improve or worsen, patient was instructed to return to clinic for further evaluation.         This document has been electronically signed by REAL Sanchez on  Pattie 3, 2021 15:48 CDT

## 2021-06-04 LAB
ALBUMIN SERPL-MCNC: 4.5 G/DL (ref 3.5–5.2)
ALBUMIN/GLOB SERPL: 1.5 G/DL
ALP SERPL-CCNC: 99 U/L (ref 39–117)
ALT SERPL W P-5'-P-CCNC: 23 U/L (ref 1–41)
ANION GAP SERPL CALCULATED.3IONS-SCNC: 13.8 MMOL/L (ref 5–15)
AST SERPL-CCNC: 17 U/L (ref 1–40)
BILIRUB SERPL-MCNC: 0.3 MG/DL (ref 0–1.2)
BUN SERPL-MCNC: 11 MG/DL (ref 6–20)
BUN/CREAT SERPL: 12.4 (ref 7–25)
CALCIUM SPEC-SCNC: 9.4 MG/DL (ref 8.6–10.5)
CHLORIDE SERPL-SCNC: 99 MMOL/L (ref 98–107)
CO2 SERPL-SCNC: 26.2 MMOL/L (ref 22–29)
CREAT SERPL-MCNC: 0.89 MG/DL (ref 0.76–1.27)
DEPRECATED RDW RBC AUTO: 42.3 FL (ref 37–54)
ERYTHROCYTE [DISTWIDTH] IN BLOOD BY AUTOMATED COUNT: 13.1 % (ref 12.3–15.4)
GFR SERPL CREATININE-BSD FRML MDRD: 99 ML/MIN/1.73
GLOBULIN UR ELPH-MCNC: 3.1 GM/DL
GLUCOSE SERPL-MCNC: 112 MG/DL (ref 65–99)
HCT VFR BLD AUTO: 46.3 % (ref 37.5–51)
HGB BLD-MCNC: 15.8 G/DL (ref 13–17.7)
MCH RBC QN AUTO: 30.4 PG (ref 26.6–33)
MCHC RBC AUTO-ENTMCNC: 34.1 G/DL (ref 31.5–35.7)
MCV RBC AUTO: 89 FL (ref 79–97)
PLATELET # BLD AUTO: 322 10*3/MM3 (ref 140–450)
PMV BLD AUTO: 11.5 FL (ref 6–12)
POTASSIUM SERPL-SCNC: 4.6 MMOL/L (ref 3.5–5.2)
PROT SERPL-MCNC: 7.6 G/DL (ref 6–8.5)
RBC # BLD AUTO: 5.2 10*6/MM3 (ref 4.14–5.8)
SODIUM SERPL-SCNC: 139 MMOL/L (ref 136–145)
TSH SERPL DL<=0.05 MIU/L-ACNC: 3.91 UIU/ML (ref 0.27–4.2)
VIT B12 BLD-MCNC: 409 PG/ML (ref 211–946)
WBC # BLD AUTO: 10.48 10*3/MM3 (ref 3.4–10.8)

## 2021-06-07 DIAGNOSIS — R73.9 HYPERGLYCEMIA: Primary | ICD-10-CM

## 2021-10-05 DIAGNOSIS — M89.8X1 PAIN OF RIGHT CLAVICLE: Primary | ICD-10-CM

## 2021-10-05 DIAGNOSIS — M89.8X9 RADIOLUCENT LESION OF BONE: ICD-10-CM

## 2021-10-06 ENCOUNTER — OFFICE VISIT (OUTPATIENT)
Dept: ORTHOPEDIC SURGERY | Facility: CLINIC | Age: 31
End: 2021-10-06

## 2021-10-06 VITALS
HEIGHT: 68 IN | SYSTOLIC BLOOD PRESSURE: 153 MMHG | WEIGHT: 265.9 LBS | OXYGEN SATURATION: 99 % | BODY MASS INDEX: 40.3 KG/M2 | DIASTOLIC BLOOD PRESSURE: 88 MMHG | HEART RATE: 77 BPM

## 2021-10-06 DIAGNOSIS — M89.8X9 RADIOLUCENT LESION OF BONE: ICD-10-CM

## 2021-10-06 DIAGNOSIS — M85.60 BONE CYST: ICD-10-CM

## 2021-10-06 DIAGNOSIS — M89.8X1 PAIN OF RIGHT CLAVICLE: Primary | ICD-10-CM

## 2021-10-06 PROCEDURE — 99213 OFFICE O/P EST LOW 20 MIN: CPT | Performed by: ORTHOPAEDIC SURGERY

## 2021-10-06 NOTE — PROGRESS NOTES
"Thien Green is a 31 y.o. male returns for     Chief Complaint   Patient presents with   • Right Clavicle - Follow-up       HISTORY OF PRESENT ILLNESS: Patient being seen for right clavicle follow up. X-rays done today.     Mr. Green is having no pain.  He has continued his normal activities.       CONCURRENT MEDICAL HISTORY:    The following portions of the patient's history were reviewed and updated as appropriate: allergies, current medications, past family history, past medical history, past social history, past surgical history and problem list.         PHYSICAL EXAMINATION:       /88 (BP Location: Left arm, Patient Position: Sitting, Cuff Size: Adult)   Pulse 77   Ht 172.7 cm (68\")   Wt 121 kg (265 lb 14.4 oz)   SpO2 99%   BMI 40.43 kg/m²     Physical Exam is alert and in no apparent distress.    GAIT:     [x]  Normal  []  Antalgic    Assistive device: [x]  None  []  Walker     []  Crutches  []  Cane     []  Wheelchair  []  Stretcher    Ortho Exam right shoulder motion is full smooth and painless.  There is no visible or palpable abnormality about the shoulder.  The clavicle is nontender.      XR Clavicle Right    Result Date: 10/6/2021  Narrative: Ordering Provider:  Dionicio Kirby MD Ordering Diagnosis/Indication:  Pain of right clavicle, Radiolucent lesion of bone Procedure:  XR CLAVICLE RIGHT Exam Date:  10/6/21 COMPARISON: Exam of the shoulder dated 22 March 2021     Impression:  Radiographs of the right clavicle 2 AP views done today show a loculated radiolucent lesion involving the distal 1/3-1/2 of the clavicle.  There is no evidence of periostitis or fracture.  Study is unchanged from previous exams. Dionicio Kirby MD 10/6/21            ASSESSMENT: Stable lesion right clavicle.  This has the appearance and behavior of a benign lesion.  He understands that in order to establish a definitive diagnosis would require advanced imaging and likely biopsy.  In the absence of any " symptoms I recommended simple observation.  He may continue unrestricted activities.  If he develops any further tantrums he will call for reevaluation.    Diagnoses and all orders for this visit:    Pain of right clavicle    Radiolucent lesion of bone    Bone cyst          PLAN    Return if symptoms worsen or fail to improve.    Dionicio Kirby MD

## 2022-05-29 DIAGNOSIS — R00.0 TACHYCARDIA: ICD-10-CM

## 2022-05-29 DIAGNOSIS — I10 ESSENTIAL HYPERTENSION: ICD-10-CM

## 2022-05-31 RX ORDER — PROPRANOLOL HCL 60 MG
CAPSULE, EXTENDED RELEASE 24HR ORAL
Qty: 90 CAPSULE | Refills: 0 | Status: SHIPPED | OUTPATIENT
Start: 2022-05-31 | End: 2022-10-25

## 2022-10-23 DIAGNOSIS — I10 ESSENTIAL HYPERTENSION: ICD-10-CM

## 2022-10-23 DIAGNOSIS — R00.0 TACHYCARDIA: ICD-10-CM

## 2022-10-25 RX ORDER — PROPRANOLOL HCL 60 MG
CAPSULE, EXTENDED RELEASE 24HR ORAL
Qty: 30 CAPSULE | Refills: 0 | Status: SHIPPED | OUTPATIENT
Start: 2022-10-25 | End: 2022-11-21

## 2022-11-21 DIAGNOSIS — I10 ESSENTIAL HYPERTENSION: ICD-10-CM

## 2022-11-21 DIAGNOSIS — R00.0 TACHYCARDIA: ICD-10-CM

## 2022-11-21 RX ORDER — PROPRANOLOL HCL 60 MG
CAPSULE, EXTENDED RELEASE 24HR ORAL
Qty: 30 CAPSULE | Refills: 0 | Status: SHIPPED | OUTPATIENT
Start: 2022-11-21 | End: 2022-12-21

## 2022-12-21 DIAGNOSIS — R00.0 TACHYCARDIA: ICD-10-CM

## 2022-12-21 DIAGNOSIS — I10 ESSENTIAL HYPERTENSION: ICD-10-CM

## 2022-12-21 RX ORDER — PROPRANOLOL HCL 60 MG
60 CAPSULE, EXTENDED RELEASE 24HR ORAL DAILY
Qty: 30 CAPSULE | Refills: 0 | Status: SHIPPED | OUTPATIENT
Start: 2022-12-21 | End: 2023-03-06

## 2023-02-19 DIAGNOSIS — I10 ESSENTIAL HYPERTENSION: ICD-10-CM

## 2023-02-19 DIAGNOSIS — R00.0 TACHYCARDIA: ICD-10-CM

## 2023-02-20 RX ORDER — PROPRANOLOL HCL 60 MG
CAPSULE, EXTENDED RELEASE 24HR ORAL
Qty: 30 CAPSULE | Refills: 0 | OUTPATIENT
Start: 2023-02-20

## 2023-03-05 DIAGNOSIS — I10 ESSENTIAL HYPERTENSION: ICD-10-CM

## 2023-03-05 DIAGNOSIS — R00.0 TACHYCARDIA: ICD-10-CM

## 2023-03-06 RX ORDER — PROPRANOLOL HCL 60 MG
60 CAPSULE, EXTENDED RELEASE 24HR ORAL DAILY
Qty: 30 CAPSULE | Refills: 0 | Status: SHIPPED | OUTPATIENT
Start: 2023-03-06

## 2024-11-15 ENCOUNTER — OFFICE VISIT (OUTPATIENT)
Dept: FAMILY MEDICINE CLINIC | Facility: CLINIC | Age: 34
End: 2024-11-15
Payer: COMMERCIAL

## 2024-11-15 VITALS
SYSTOLIC BLOOD PRESSURE: 137 MMHG | RESPIRATION RATE: 20 BRPM | OXYGEN SATURATION: 98 % | BODY MASS INDEX: 46.07 KG/M2 | WEIGHT: 304 LBS | TEMPERATURE: 98 F | DIASTOLIC BLOOD PRESSURE: 85 MMHG | HEIGHT: 68 IN | HEART RATE: 90 BPM

## 2024-11-15 DIAGNOSIS — F41.1 GENERALIZED ANXIETY DISORDER: ICD-10-CM

## 2024-11-15 DIAGNOSIS — E66.01 CLASS 3 SEVERE OBESITY WITHOUT SERIOUS COMORBIDITY WITH BODY MASS INDEX (BMI) OF 45.0 TO 49.9 IN ADULT, UNSPECIFIED OBESITY TYPE: ICD-10-CM

## 2024-11-15 DIAGNOSIS — Z76.89 ENCOUNTER TO ESTABLISH CARE: Primary | ICD-10-CM

## 2024-11-15 DIAGNOSIS — M72.2 PLANTAR FASCIITIS, BILATERAL: ICD-10-CM

## 2024-11-15 DIAGNOSIS — E66.813 CLASS 3 SEVERE OBESITY WITHOUT SERIOUS COMORBIDITY WITH BODY MASS INDEX (BMI) OF 45.0 TO 49.9 IN ADULT, UNSPECIFIED OBESITY TYPE: ICD-10-CM

## 2024-11-15 RX ORDER — BUSPIRONE HYDROCHLORIDE 10 MG/1
10 TABLET ORAL 2 TIMES DAILY PRN
Qty: 60 TABLET | Refills: 1 | Status: SHIPPED | OUTPATIENT
Start: 2024-11-15

## 2024-11-15 NOTE — PROGRESS NOTES
CC:   Chief Complaint   Patient presents with    Anxiety     Wanting to discuss taking an as needed medication and not an every day pill.        History:  Thien Green is a 34 y.o. male who presents today for evaluation of the above problems.      HPI    Patient presents to [facility].  Reports concerns for anxiety.  States he has had this for a while and has tried medication before including duloxetine and Vraylar.  States those medications did not seem to improve symptoms.  Patient wants to try an as needed medication for anxiety like BuSpar.  States he is not good at remembering to take medication every day.  Patient states he just has times where he feels overwhelmed and extra stressed causing him to be more irritable.    Patient states he used to take lisinopril for hypertension.  Blood pressure looks okay today.  Has not taken lisinopril in over 3 months.  Patient is due for a wellness exam and labs.    Patient complaining of bilateral heel pain ongoing for weeks.  Reports it is worsening and left heel hurts the most.  Pain worse first thing in the morning and as the day goes on seems to improved.  He has tried buying different boots to see if that helps with pain with no improvement.  States sometimes he walks with a limp due to pain.      No Known Allergies  Past Medical History:   Diagnosis Date    Anxiety     Hypertension      Past Surgical History:   Procedure Laterality Date    NO PAST SURGERIES       Family History   Problem Relation Age of Onset    No Known Problems Mother     No Known Problems Father       reports that he has been smoking cigarettes. He started smoking about 18 years ago. He has a 18.9 pack-year smoking history. He has never used smokeless tobacco. He reports that he does not currently use alcohol. He reports that he does not use drugs.      Current Outpatient Medications:     busPIRone (BUSPAR) 10 MG tablet, Take 1 tablet by mouth 2 (Two) Times a Day As Needed (anxiety)., Disp:  "60 tablet, Rfl: 1    OBJECTIVE:  /85 (BP Location: Right arm, Patient Position: Sitting, Cuff Size: Large Adult)   Pulse 90   Temp 98 °F (36.7 °C) (Temporal)   Resp 20   Ht 172.7 cm (68\")   Wt (!) 138 kg (304 lb)   SpO2 98%   BMI 46.22 kg/m²    Estimated body mass index is 46.22 kg/m² as calculated from the following:    Height as of this encounter: 172.7 cm (68\").    Weight as of this encounter: 138 kg (304 lb).                Physical Exam  Vitals reviewed.   Constitutional:       General: He is not in acute distress.     Appearance: Normal appearance. He is obese.   HENT:      Head: Normocephalic and atraumatic.   Cardiovascular:      Rate and Rhythm: Normal rate and regular rhythm.      Heart sounds: Normal heart sounds.   Pulmonary:      Effort: No respiratory distress.      Breath sounds: Normal breath sounds. No wheezing.   Musculoskeletal:         General: Normal range of motion.   Skin:     General: Skin is warm and dry.   Neurological:      Mental Status: He is alert and oriented to person, place, and time.   Psychiatric:         Mood and Affect: Mood normal.         Behavior: Behavior normal.         Thought Content: Thought content normal.              Assessment/Plan    Diagnoses and all orders for this visit:    1. Encounter to establish care (Primary)    2. Generalized anxiety disorder  Assessment & Plan:  Medication side effects discussed.  If any concerns patient to let me know.  Will follow-up in 1 month    Orders:  -     busPIRone (BUSPAR) 10 MG tablet; Take 1 tablet by mouth 2 (Two) Times a Day As Needed (anxiety).  Dispense: 60 tablet; Refill: 1    3. Plantar fasciitis, bilateral  Assessment & Plan:  Discussed conservative treatments for plantar fasciitis.  Offered to do x-ray to evaluate further but will wait at this time.  Handout given about plantar fasciitis.  Discussed exercises, compression socks and over-the-counter NSAIDs to help with symptoms.      4. Class 3 severe obesity " without serious comorbidity with body mass index (BMI) of 45.0 to 49.9 in adult, unspecified obesity type  Assessment & Plan:  Patient's (Body mass index is 46.22 kg/m².) indicates that they are morbidly/severely obese (BMI > 40 or > 35 with obesity - related health condition) with health conditions that include none . Weight is unchanged. BMI  is above average; BMI management plan is completed. We discussed low calorie, low carb based diet program, portion control, and increasing exercise. Patient states he has a gym membership and needs to start going.  I encouraged patient to wean off sugary drinks and sodas.  I encouraged patient to try to lose 4 pounds in the next month.                   An After Visit Summary was printed and given to the patient at discharge.  Return in about 1 month (around 12/15/2024) for Annual physical.

## 2024-11-15 NOTE — ASSESSMENT & PLAN NOTE
Discussed conservative treatments for plantar fasciitis.  Offered to do x-ray to evaluate further but will wait at this time.  Handout given about plantar fasciitis.  Discussed exercises, compression socks and over-the-counter NSAIDs to help with symptoms.

## 2024-11-15 NOTE — ASSESSMENT & PLAN NOTE
Patient's (Body mass index is 46.22 kg/m².) indicates that they are morbidly/severely obese (BMI > 40 or > 35 with obesity - related health condition) with health conditions that include none . Weight is unchanged. BMI  is above average; BMI management plan is completed. We discussed low calorie, low carb based diet program, portion control, and increasing exercise. Patient states he has a gym membership and needs to start going.  I encouraged patient to wean off sugary drinks and sodas.  I encouraged patient to try to lose 4 pounds in the next month.

## 2024-11-15 NOTE — ASSESSMENT & PLAN NOTE
Medication side effects discussed.  If any concerns patient to let me know.  Will follow-up in 1 month

## 2024-12-19 ENCOUNTER — TELEPHONE (OUTPATIENT)
Dept: FAMILY MEDICINE CLINIC | Facility: CLINIC | Age: 34
End: 2024-12-19
Payer: COMMERCIAL

## 2025-01-10 DIAGNOSIS — F41.1 GENERALIZED ANXIETY DISORDER: ICD-10-CM

## 2025-01-10 RX ORDER — BUSPIRONE HYDROCHLORIDE 10 MG/1
10 TABLET ORAL 2 TIMES DAILY PRN
Qty: 60 TABLET | Refills: 0 | Status: SHIPPED | OUTPATIENT
Start: 2025-01-10

## 2025-02-15 DIAGNOSIS — F41.1 GENERALIZED ANXIETY DISORDER: ICD-10-CM

## 2025-02-17 RX ORDER — BUSPIRONE HYDROCHLORIDE 10 MG/1
10 TABLET ORAL 2 TIMES DAILY PRN
Qty: 60 TABLET | Refills: 1 | Status: SHIPPED | OUTPATIENT
Start: 2025-02-17

## 2025-02-17 NOTE — TELEPHONE ENCOUNTER
Rx Refill Note  Requested Prescriptions     Pending Prescriptions Disp Refills    busPIRone (BUSPAR) 10 MG tablet [Pharmacy Med Name: busPIRone HCl 10 MG Oral Tablet] 60 tablet 0     Sig: Take 1 tablet by mouth twice daily as needed for anxiety      Last office visit with prescribing clinician: 11/15/24  Last telemedicine visit with prescribing clinician: Visit date not found   Next office visit with prescribing clinician: 12/13/24 patient canceled appointment    Marianne Santoro MA  02/17/25, 09:11 CST

## 2025-02-17 NOTE — TELEPHONE ENCOUNTER
Patient canceled his wellness appt. Can you see about him getting rescheduled for that over the next month? Thanks!

## 2025-04-14 DIAGNOSIS — F41.1 GENERALIZED ANXIETY DISORDER: ICD-10-CM

## 2025-04-14 RX ORDER — BUSPIRONE HYDROCHLORIDE 10 MG/1
10 TABLET ORAL 2 TIMES DAILY PRN
Qty: 60 TABLET | Refills: 1 | Status: SHIPPED | OUTPATIENT
Start: 2025-04-14

## 2025-04-14 NOTE — TELEPHONE ENCOUNTER
Rx Refill Note  Requested Prescriptions     Pending Prescriptions Disp Refills    busPIRone (BUSPAR) 10 MG tablet [Pharmacy Med Name: busPIRone HCl 10 MG Oral Tablet] 60 tablet 0     Sig: Take 1 tablet by mouth twice daily as needed for anxiety      Last office visit with prescribing clinician: 11/15/2024   Last telemedicine visit with prescribing clinician: Visit date not found   Next office visit with prescribing clinician:     12/13/24 patient canceled appointment.    Marianne Santoro MA  04/14/25, 07:32 CDT

## 2025-06-15 DIAGNOSIS — F41.1 GENERALIZED ANXIETY DISORDER: ICD-10-CM

## 2025-06-16 RX ORDER — BUSPIRONE HYDROCHLORIDE 10 MG/1
10 TABLET ORAL 2 TIMES DAILY PRN
Qty: 60 TABLET | Refills: 0 | Status: SHIPPED | OUTPATIENT
Start: 2025-06-16

## 2025-06-16 NOTE — TELEPHONE ENCOUNTER
Rx Refill Note  Requested Prescriptions     Pending Prescriptions Disp Refills    busPIRone (BUSPAR) 10 MG tablet [Pharmacy Med Name: busPIRone HCl 10 MG Oral Tablet] 60 tablet 0     Sig: Take 1 tablet by mouth twice daily as needed for anxiety      Last office visit with prescribing clinician: 11/15/2024   Last telemedicine visit with prescribing clinician: Visit date not found   Next office visit with prescribing clinician:     5/2/25, 12/13/24 patient canceled appointment    Marianne Santoro MA  06/16/25, 07:39 CDT

## 2025-07-18 DIAGNOSIS — F41.1 GENERALIZED ANXIETY DISORDER: ICD-10-CM

## 2025-07-18 RX ORDER — BUSPIRONE HYDROCHLORIDE 10 MG/1
10 TABLET ORAL 2 TIMES DAILY PRN
Qty: 60 TABLET | Refills: 0 | Status: SHIPPED | OUTPATIENT
Start: 2025-07-18

## 2025-07-18 NOTE — TELEPHONE ENCOUNTER
Rx Refill Note  Requested Prescriptions     Pending Prescriptions Disp Refills    busPIRone (BUSPAR) 10 MG tablet [Pharmacy Med Name: busPIRone HCl 10 MG Oral Tablet] 60 tablet 0     Sig: Take 1 tablet by mouth twice daily as needed for anxiety      Last office visit with prescribing clinician: Visit date not found   Last telemedicine visit with prescribing clinician: Visit date not found   Next office visit with prescribing clinician: Visit date not found                         Would you like a call back once the refill request has been completed: [] Yes [] No    If the office needs to give you a call back, can they leave a voicemail: [] Yes [] No    Marianne Macario MA  07/18/25, 10:07 CDT